# Patient Record
Sex: FEMALE | NOT HISPANIC OR LATINO | URBAN - METROPOLITAN AREA
[De-identification: names, ages, dates, MRNs, and addresses within clinical notes are randomized per-mention and may not be internally consistent; named-entity substitution may affect disease eponyms.]

---

## 2018-12-18 ENCOUNTER — OFFICE VISIT (OUTPATIENT)
Dept: URBAN - METROPOLITAN AREA CLINIC 33 | Facility: CLINIC | Age: 33
End: 2018-12-18
Payer: COMMERCIAL

## 2018-12-18 DIAGNOSIS — T15.11XA FOREIGN BODY IN CONJUNCTIVAL SAC OF RIGHT EYE, INITIAL ENCOUNTER: Primary | ICD-10-CM

## 2018-12-18 PROCEDURE — 65205 REMOVE FOREIGN BODY FROM EYE: CPT | Performed by: OPHTHALMOLOGY

## 2018-12-18 PROCEDURE — 99213 OFFICE O/P EST LOW 20 MIN: CPT | Performed by: OPHTHALMOLOGY

## 2018-12-18 RX ORDER — LIFITEGRAST 50 MG/ML
5 % SOLUTION/ DROPS OPHTHALMIC
Qty: 130 | Refills: 3 | Status: INACTIVE
Start: 2018-12-18 | End: 2019-01-07

## 2018-12-18 RX ORDER — OFLOXACIN 3 MG/ML
0.3 % SOLUTION/ DROPS OPHTHALMIC
Qty: 5 | Refills: 1 | Status: ACTIVE
Start: 2018-12-18

## 2018-12-18 ASSESSMENT — INTRAOCULAR PRESSURE
OD: 14
OS: 11

## 2018-12-18 NOTE — IMPRESSION/PLAN
Impression: Foreign body in conjunctival sac of right eye, initial encounter: T15.11xA. Condition: quality of life issue. Plan: Discussed diagnosis in detail with patient. Discussed treatment options with patient. 2 foreign body removed RUL today. Start Ofloxacin QID OD. Continue Xiidra BID OU. Observe. Call if symptoms worsen.